# Patient Record
Sex: FEMALE | Race: WHITE
[De-identification: names, ages, dates, MRNs, and addresses within clinical notes are randomized per-mention and may not be internally consistent; named-entity substitution may affect disease eponyms.]

---

## 2019-06-12 ENCOUNTER — HOSPITAL ENCOUNTER (INPATIENT)
Dept: HOSPITAL 96 - M.ERS | Age: 49
LOS: 2 days | Discharge: HOME | DRG: 176 | End: 2019-06-14
Attending: INTERNAL MEDICINE | Admitting: INTERNAL MEDICINE
Payer: COMMERCIAL

## 2019-06-12 VITALS — SYSTOLIC BLOOD PRESSURE: 140 MMHG | DIASTOLIC BLOOD PRESSURE: 103 MMHG

## 2019-06-12 VITALS — WEIGHT: 214 LBS | HEIGHT: 62.99 IN | BODY MASS INDEX: 37.92 KG/M2

## 2019-06-12 DIAGNOSIS — Z82.49: ICD-10-CM

## 2019-06-12 DIAGNOSIS — J45.909: ICD-10-CM

## 2019-06-12 DIAGNOSIS — I26.99: Primary | ICD-10-CM

## 2019-06-12 DIAGNOSIS — F17.210: ICD-10-CM

## 2019-06-12 DIAGNOSIS — F12.90: ICD-10-CM

## 2019-06-12 DIAGNOSIS — K29.70: ICD-10-CM

## 2019-06-12 DIAGNOSIS — Z79.899: ICD-10-CM

## 2019-06-12 LAB
ABSOLUTE BASOPHILS: 0 THOU/UL (ref 0–0.2)
ABSOLUTE EOSINOPHILS: 0.1 THOU/UL (ref 0–0.7)
ABSOLUTE MONOCYTES: 0.8 THOU/UL (ref 0–1.2)
ALBUMIN SERPL-MCNC: 2.9 G/DL (ref 3.4–5)
ALP SERPL-CCNC: 90 U/L (ref 46–116)
ALT SERPL-CCNC: 28 U/L (ref 30–65)
ANION GAP SERPL CALC-SCNC: 5 MMOL/L (ref 7–16)
APTT BLD: 28.6 SECONDS (ref 25–31.3)
AST SERPL-CCNC: 23 U/L (ref 15–37)
BASOPHILS NFR BLD AUTO: 0.9 %
BILIRUB SERPL-MCNC: 0.4 MG/DL
BUN SERPL-MCNC: 12 MG/DL (ref 7–18)
CALCIUM SERPL-MCNC: 8.3 MG/DL (ref 8.5–10.1)
CHLORIDE SERPL-SCNC: 107 MMOL/L (ref 98–107)
CO2 SERPL-SCNC: 30 MMOL/L (ref 21–32)
CREAT SERPL-MCNC: 0.7 MG/DL (ref 0.6–1.3)
EOSINOPHIL NFR BLD: 1.8 %
GLUCOSE SERPL-MCNC: 98 MG/DL (ref 70–99)
GRANULOCYTES NFR BLD MANUAL: 57.4 %
HCT VFR BLD CALC: 38.2 % (ref 37–47)
HGB BLD-MCNC: 12.5 GM/DL (ref 12–15)
INR PPP: 1
LIPASE: 207 U/L (ref 73–393)
LYMPHOCYTES # BLD: 1.2 THOU/UL (ref 0.8–5.3)
LYMPHOCYTES NFR BLD AUTO: 24.4 %
MCH RBC QN AUTO: 25.9 PG (ref 26–34)
MCHC RBC AUTO-ENTMCNC: 32.8 G/DL (ref 28–37)
MCV RBC: 79.1 FL (ref 80–100)
MONOCYTES NFR BLD: 15.5 %
MPV: 8 FL. (ref 7.2–11.1)
NEUTROPHILS # BLD: 2.9 THOU/UL (ref 1.6–8.1)
NUCLEATED RBCS: 0 /100WBC
PLATELET COUNT*: 250 THOU/UL (ref 150–400)
POTASSIUM SERPL-SCNC: 4.4 MMOL/L (ref 3.5–5.1)
PROT SERPL-MCNC: 7 G/DL (ref 6.4–8.2)
PROTHROMBIN TIME: 9.9 SECONDS (ref 9.2–11.5)
RBC # BLD AUTO: 4.82 MIL/UL (ref 4.2–5)
RDW-CV: 17.7 % (ref 10.5–14.5)
SODIUM SERPL-SCNC: 142 MMOL/L (ref 136–145)
TROPONIN-I LEVEL: <0.06 NG/ML (ref ?–0.06)
WBC # BLD AUTO: 5 THOU/UL (ref 4–11)

## 2019-06-13 VITALS — SYSTOLIC BLOOD PRESSURE: 140 MMHG | DIASTOLIC BLOOD PRESSURE: 65 MMHG

## 2019-06-13 VITALS — SYSTOLIC BLOOD PRESSURE: 129 MMHG | DIASTOLIC BLOOD PRESSURE: 78 MMHG

## 2019-06-13 VITALS — SYSTOLIC BLOOD PRESSURE: 100 MMHG | DIASTOLIC BLOOD PRESSURE: 57 MMHG

## 2019-06-13 VITALS — DIASTOLIC BLOOD PRESSURE: 58 MMHG | SYSTOLIC BLOOD PRESSURE: 105 MMHG

## 2019-06-13 VITALS — SYSTOLIC BLOOD PRESSURE: 117 MMHG | DIASTOLIC BLOOD PRESSURE: 57 MMHG

## 2019-06-13 LAB — AMP/METHAMP: POSITIVE

## 2019-06-13 NOTE — NUR
RECEIEVED REPORT FROM [facility] IN ER OF EXPECTED ADMISSION AT 1542- DX:
CHEST PAIN WITH NEW ONSET OF PE- PT ARRIVED TO ROOM 221 VIA CART, SBA TO BED-
CARDIAC MONITOR PLACED AS ORDERED, TRACING SR- PT A&O X 4- CONTINENT OF BOWEL
AND BLADDER- UP AD-CLARISA IN ROOM, STEADY GAIT NOTED-PT NOTED TO BE FIGGITY AT
TIMES-TEARFUL- DIMINISHED LUNG SOUNDS NOTED- VS 97.4 18 129/78 81 97% ON RA-
ABD SOFT/ROUND/OBESE, BS X4 QUADS- LAST BM REPORTED 6/12/19- IV NOTED TO RIGHT
WRIST INTACT AND SL- SKIN C/D/I, TATOOS NOTED-DENIES ANY C/O PAIN AT TIME OF
ADMISSION- CALL LIGHT AND PERSONAL BELONGINGS WITH IN REACH- HOURLY ROUNDS IN
PLACE R/T SAFETY/NEEDS- ALL NEEDS MET AT THIS TIME-WCTM

## 2019-06-13 NOTE — EKG
[place] [address]
Phone:  [phone]                     ELECTROCARDIOGRAM REPORT      
_______________________________________________________________________________
 
Name:       TORREY DINEROELLE LAILA                Room:           [address]    ADM IN  
M.R.#:  [id number]      Account #:      [id number]  
Admission:  19     Attend Phys:    SNEHAL Denny
Discharge:               Date of Birth:  [birth year]  
         Report #: [id number]
    [id number]
_______________________________________________________________________________
THIS REPORT FOR:  //name//                      
 
                         [facility] ED
                                       
Test Date:    2019               Test Time:    13:33:34
Pat Name:     RHONDA DINERO            Department:   
Patient ID:   [id number]            Room:         [facility]
Gender:       F                        Technician:   MS
:          1970               Requested By: Quoc Dos Santos
Order Number: [id number]    Star MD:   Bandar Gurrola
                                 Measurements
Intervals                              Axis          
Rate:         65                       P:            53
MI:           169                      QRS:          79
QRSD:         88                       T:            74
QT:           402                                    
QTc:          418                                    
                           Interpretive Statements
Sinus rhythm
Baseline wander in lead(s) I,II,aVR,aVL,V1,V2,V3,V4,V5,V6
Compared to ECG 2016 23:27:29
No significant changes noted
Electronically Signed On 2019 17:22:30 CDT by Bandar Gurrola
[email or web address]
 
 
 
 
 
 
 
 
 
 
 
 
 
 
 
 
 
 
 
  <ELECTRONICALLY SIGNED>
                                           By: Bandar Gurrola MD, FACC   
  19     1722
D: 19 1333   _____________________________________
T: 19 1333   Bandar Gurrola MD, [facility]     /EPI

## 2019-06-13 NOTE — EKG
[place] [address]
Phone:  [phone]                     ELECTROCARDIOGRAM REPORT      
_______________________________________________________________________________
 
Name:       RHONDA DINERO                Room:           [address]    ADM IN  
M.R.#:  [id number]      Account #:      [id number]  
Admission:  19     Attend Phys:    SNEHAL Denny
Discharge:               Date of Birth:  [birth year]  
         Report #: [id number]
    [id number]
_______________________________________________________________________________
THIS REPORT FOR:  //name//                      
 
                         [facility] ED
                                       
Test Date:    2019               Test Time:    22:56:38
Pat Name:     RHONDA DINERO            Department:   
Patient ID:   [id number]            Room:         [facility]
Gender:       F                        Technician:   L[place]
:          1970               Requested By: Dolores Echevarria
Order Number: [id number] MD:   Bandar Gurrola
                                 Measurements
Intervals                              Axis          
Rate:         85                       P:            56
OR:           150                      QRS:          95
QRSD:         115                      T:            82
QT:           377                                    
QTc:          449                                    
                           Interpretive Statements
Sinus rhythm
Low voltage, extremity and precordial leads
Baseline wander in lead(s) I,III,aVR,aVL,V1,V2,V6
Compared to ECG 2016 23:27:29
Intraventricular conduction delay now present
Low QRS voltage now present
 
Electronically Signed On 2019 17:13:44 CDT by Bandar Gurrola
[email or web address]
 
 
 
 
 
 
 
 
 
 
 
 
 
 
 
 
  <ELECTRONICALLY SIGNED>
                                           By: Bandar Gurrola MD, FACC   
  19     1713
D: 19 2256   _____________________________________
T: 19 2256   Bandar Gurrola MD, FACC     /EPI

## 2019-06-14 VITALS — DIASTOLIC BLOOD PRESSURE: 66 MMHG | SYSTOLIC BLOOD PRESSURE: 102 MMHG

## 2019-06-14 VITALS — SYSTOLIC BLOOD PRESSURE: 114 MMHG | DIASTOLIC BLOOD PRESSURE: 84 MMHG

## 2019-06-14 VITALS — DIASTOLIC BLOOD PRESSURE: 69 MMHG | SYSTOLIC BLOOD PRESSURE: 112 MMHG

## 2019-06-14 VITALS — DIASTOLIC BLOOD PRESSURE: 68 MMHG | SYSTOLIC BLOOD PRESSURE: 114 MMHG

## 2019-06-14 LAB
INR PPP: 1
PROTHROMBIN TIME: 9.8 SECONDS (ref 9.2–11.5)

## 2019-06-14 NOTE — NUR
cm completed initial assessment to discuss d/c planning. pt is a&ox4. active
and independent. lives at home w/her fahter. has adult children that provides
support. pt denies hx w/hh or snf. 0 dme. [facility] will assist w/medication, as pt
has no insurance and is medicaid pending. pt goal is to d/c to home. cm to
remain avialable to assist as needed.

## 2019-06-14 NOTE — 2DMMODE
[place] [address]
Phone:  [phone] 2 D/M-MODE ECHOCARDIOGRAM     
_______________________________________________________________________________
 
Name:         RHONDA DINERO LAILA               Room:          [address]    ADM IN 
[facility]#:    [id number]     Account #:     [id number]  
Admission:    19    Attend Phys:   Quoc Dos Santos
Discharge:                Date of Birth: [birth year]  
Date of Service: 19 1439  Report #:      [id number]
        [id number]
_______________________________________________________________________________
THIS REPORT FOR:  //name//                      
 
 
--------------- APPROVED REPORT --------------
 
 
Study performed:  2019 10:27:04
 
EXAM: Comprehensive 2D, Doppler, and color-flow 
Echocardiogram 
Patient Location: In-Patient   
Room #:  221     Status:  routine
 
      BSA:         1.99
HR: 74 bpm BP:          112/69 mmHg 
Rhythm: NSR     
 
Other Information 
Study Quality: Good
 
Indications
Dyspnea 
 
2D Dimensions
IVSd:  10.16 (7-11mm) LVOT Diam:  21.31 (18-24mm) 
LVDd:  42.94 mm  
PWd:  9.13 (7-11mm) Ascending Ao:  31.84 (22-36mm)
LVDs:  28.54 (25-40mm) 
Aortic Root:  28.21 mm 
 
Volumes
Left Atrial Volume (Systole) 
    LA ESV Index:  16.60 mL/m2
 
Aortic Valve
AoV Peak Rafal.:  1.17 m/s 
AO Peak Gr.:  5.45 mmHg  LVOT Max P.51 mmHg
AO Mean Gr.:  3.03 mmHg  LVOT Mean P.65 mmHg
    LVOT Max V:  1.17 m/s
AO V2 VTI:  24.12 cm  LVOT Mean V:  0.75 m/s
JAE (VTI):  3.48 cm2  LVOT V1 VTI:  23.52 cm
 
Mitral Valve
    E/A Ratio:  1.55
    MV Decel. Time:  211.34 ms
MV E Max Rafal.:  0.79 m/s 
 
 
[place] [address]
Phone:  [phone]                     2 D/M-MODE ECHOCARDIOGRAM     
_______________________________________________________________________________
 
Name:         RHONDA DINERO               Room:          [address] [place] 
..#:    [id number]     Account #:     [id number]  
Admission:    19    Attend Phys:   Quoc Dos Santos
Discharge:                Date of Birth: [birth year]  
Date of Service: 19 1439  Report #:      [id number]
        [id number]
_______________________________________________________________________________
MV PHT:  61.29 ms  
MVA (PHT):  3.59 cm2  
 
TDI
E/Lateral E':  4.65 E/Medial E':  6.58
   Medial E' Rafal.:  0.12 m/s
   Lateral E' Rafal.:  0.17 m/s
 
Pulmonary Valve
PV Peak Rafal.:  0.89 m/s PV Peak Gr.:  3.14 mmHg
 
Tricuspid Valve
    RAP Estimate:  5.00 mmHg
TR Peak Gr.:  23.43 mmHg RVSP:  28.00 mmHg
    PA Pressure:  28.00 mmHg
 
Left Ventricle
The left ventricle is normal size. There is normal LV segmental wall 
motion. There is normal left ventricular wall thickness. Left 
ventricular systolic function is normal. LVEF is 55-60%. The left 
ventricular diastolic function is normal.
 
Right Ventricle
The right ventricle is normal size. The right ventricular systolic 
function is normal.
 
Atria
The left atrium size is normal. The right atrium size is 
normal.
 
Aortic Valve
The aortic valve is normal in structure. No aortic regurgitation is 
present. There is no aortic valvular stenosis.
 
Mitral Valve
The mitral valve is normal in structure. Trace mitral regurgitation. 
No evidence of mitral valve stenosis.
 
Tricuspid Valve
The tricuspid valve is normal in structure. Trace tricuspid 
regurgitation. No pulmonary hypertension.
 
Pulmonic Valve
The pulmonary valve is normal in structure. There is no pulmonic 
valvular regurgitation.
 
 
 
[place] [address]
Phone:  [phone] 2 D/M-MODE ECHOCARDIOGRAM     
_______________________________________________________________________________
 
Name:         RHONDA DINERO               Room:          [address]#:    [id number]     Account #:     [id number]  
Admission:    19    Attend Phys:   Quoc Dos Santos
Discharge:                Date of Birth: [birth year]  
Date of Service: 19 1439  Report #:      [id number]
        [id number]
_______________________________________________________________________________
Great Vessels
The aortic root is normal in size. IVC is normal in size and 
collapses >50% with inspiration.
 
Pericardium
There is no pericardial effusion.
 
<Conclusion>
The left ventricle is normal size.
There is normal left ventricular wall thickness.
Left ventricular systolic function is normal.
LVEF is 55-60%.
The left ventricular diastolic function is normal.
Trace mitral regurgitation.
Trace tricuspid regurgitation.
No pulmonary hypertension.
IVC is normal in size and collapses >50% with inspiration.
 
 
 
 
 
 
 
 
 
 
 
 
 
 
 
 
 
 
 
 
 
 
 
 
 
 
 
  <ELECTRONICALLY SIGNED>
                                           By: Bandar Gurrola MD, FACC   
  19     1439
D: 19 1439   _____________________________________
T: 19 1439   Bandar Gurrola MD, FACC     /INF

## 2019-06-14 NOTE — NUR
ASSUMED PATIENT CARE AT 1900.  PATIENT ALERT AND ORIENTED TIMES FOUR.  MINOR
COMPLAINTS OF A HEADACHE.  IV MEDS GIVEN AS PATIENT DOES NOT HAVE ANY ORAL
MEDICATION.  VERBALIZES UNDERSTANDING OF CARE PLAN AND WHAT TO EXPECT WITH
TREATMENT.  DENIED THAT HEADACHE WAS WITHDRAWL RELATED.  RN ASSESSMENT AND
HOURLY ROUNDING COMPLETED AS DOCUMENTED.

## 2019-06-14 NOTE — NUR
VSS, ASSUMED CARE IN THE AM, ASSESSMENT PERFORMED AND CHARTED FALL PRECAUTIONS
IN PLACE AND CALL LIGHT IN REACH, PT DENIES ANY COMPLANTS OF PAIN AND IS
TRACING SR ON THE MONITOR, HER GOAL IS TO DISCHARGE TO HOME, AT THIS TIME SHE
HAS BEEN BEEN GIVEN DISCHARGE ORDERS, AND HER IV AND TELE MONITOR TAKEN OFF,
SHE HAD SCRIPTS CALLED IN AND DISCHARGE PAPERS PROVITED, PT DENIES ANY
QUESTIONS AT TIME OF D/C SHE HAD GATHERED ALL BELONGINGS AND PLACED WITH AT
TIME OF D/C HOURLY ROUNDS COMPLETED.

## 2019-06-19 ENCOUNTER — HOSPITAL ENCOUNTER (EMERGENCY)
Dept: HOSPITAL 96 - M.ERS | Age: 49
Discharge: HOME | End: 2019-06-19
Payer: COMMERCIAL

## 2019-06-19 VITALS — SYSTOLIC BLOOD PRESSURE: 91 MMHG | DIASTOLIC BLOOD PRESSURE: 56 MMHG

## 2019-06-19 VITALS — BODY MASS INDEX: 33.13 KG/M2 | WEIGHT: 180 LBS | HEIGHT: 62 IN

## 2019-06-19 DIAGNOSIS — J45.909: ICD-10-CM

## 2019-06-19 DIAGNOSIS — R07.89: Primary | ICD-10-CM

## 2019-06-19 DIAGNOSIS — F17.210: ICD-10-CM

## 2019-06-19 LAB
ABSOLUTE BASOPHILS: 0.1 THOU/UL (ref 0–0.2)
ABSOLUTE EOSINOPHILS: 0.5 THOU/UL (ref 0–0.7)
ABSOLUTE MONOCYTES: 0.8 THOU/UL (ref 0–1.2)
ALBUMIN SERPL-MCNC: 2.9 G/DL (ref 3.4–5)
ALP SERPL-CCNC: 84 U/L (ref 46–116)
ALT SERPL-CCNC: 65 U/L (ref 30–65)
ANION GAP SERPL CALC-SCNC: 9 MMOL/L (ref 7–16)
AST SERPL-CCNC: 33 U/L (ref 15–37)
BASOPHILS NFR BLD AUTO: 0.8 %
BILIRUB SERPL-MCNC: 0.3 MG/DL
BILIRUB UR-MCNC: NEGATIVE MG/DL
BUN SERPL-MCNC: 23 MG/DL (ref 7–18)
CALCIUM SERPL-MCNC: 8.8 MG/DL (ref 8.5–10.1)
CHLORIDE SERPL-SCNC: 104 MMOL/L (ref 98–107)
CO2 SERPL-SCNC: 27 MMOL/L (ref 21–32)
COLOR UR: YELLOW
CREAT SERPL-MCNC: 0.5 MG/DL (ref 0.6–1.3)
EOSINOPHIL NFR BLD: 4.5 %
GLUCOSE SERPL-MCNC: 105 MG/DL (ref 70–99)
GRANULOCYTES NFR BLD MANUAL: 57.9 %
HCT VFR BLD CALC: 38.2 % (ref 37–47)
HGB BLD-MCNC: 12.3 GM/DL (ref 12–15)
INR PPP: 1.2
KETONES UR STRIP-MCNC: NEGATIVE MG/DL
LYMPHOCYTES # BLD: 3.3 THOU/UL (ref 0.8–5.3)
LYMPHOCYTES NFR BLD AUTO: 29.9 %
MCH RBC QN AUTO: 25.2 PG (ref 26–34)
MCHC RBC AUTO-ENTMCNC: 32.1 G/DL (ref 28–37)
MCV RBC: 78.6 FL (ref 80–100)
MONOCYTES NFR BLD: 6.9 %
MPV: 7.2 FL. (ref 7.2–11.1)
NEUTROPHILS # BLD: 6.4 THOU/UL (ref 1.6–8.1)
NUCLEATED RBCS: 0 /100WBC
PLATELET COUNT*: 415 THOU/UL (ref 150–400)
POTASSIUM SERPL-SCNC: 4 MMOL/L (ref 3.5–5.1)
PROT SERPL-MCNC: 6.8 G/DL (ref 6.4–8.2)
PROT UR QL STRIP: NEGATIVE
PROTHROMBIN TIME: 11.9 SECONDS (ref 9.2–11.5)
RBC # BLD AUTO: 4.86 MIL/UL (ref 4.2–5)
RBC # UR STRIP: NEGATIVE /UL
RDW-CV: 17.5 % (ref 10.5–14.5)
SODIUM SERPL-SCNC: 140 MMOL/L (ref 136–145)
SP GR UR STRIP: 1.02 (ref 1–1.03)
URINE CLARITY: CLEAR
URINE GLUCOSE-RANDOM: NEGATIVE
URINE LEUKOCYTES-REFLEX: NEGATIVE
URINE NITRITE-REFLEX: NEGATIVE
UROBILINOGEN UR STRIP-ACNC: 0.2 E.U./DL (ref 0.2–1)
WBC # BLD AUTO: 11.1 THOU/UL (ref 4–11)

## 2019-06-19 NOTE — EKG
[place] [address]
Phone:  [phone]                     ELECTROCARDIOGRAM REPORT      
_______________________________________________________________________________
 
Name:       RHONDA DINERO                Room:                      [facility]  
HENRRY#:  [id number]      Account #:      [id number]  
Admission:  19     Attend Phys:                         
Discharge:  19     Date of Birth:  [birth year]  
         Report #: [id number]
    [id number]
_______________________________________________________________________________
THIS REPORT FOR:  //name//                      
 
                         [facility] ED
                                       
Test Date:    2019               Test Time:    04:02:42
Pat Name:     RHONDA DINERO            Department:   
Patient ID:   [id number]            Room:          
Gender:       F                        Technician:   ALVERTO
:          1970               Requested By: Karin Chu
Order Number: [id number]    Reading MD:   Mikel Nelson
                                 Measurements
Intervals                              Axis          
Rate:         92                       P:            71
DE:           167                      QRS:          87
QRSD:         78                       T:            68
QT:           342                                    
QTc:          424                                    
                           Interpretive Statements
Sinus rhythm
Compared to ECG 2019 13:33:34
No significant changes
 
Electronically Signed On 2019 11:04:40 CDT by Mikel Nelson
[email or web address]
 
 
 
 
 
 
 
 
 
 
 
 
 
 
 
 
 
 
 
  <ELECTRONICALLY SIGNED>
                                           By: Mikel Nelson MD, [facility]      
  19     1104
D: 19 040   _____________________________________
T: 19 0402   Mikel Nelson MD, FACC        /EPI

## 2019-06-27 ENCOUNTER — HOSPITAL ENCOUNTER (EMERGENCY)
Dept: HOSPITAL 96 - M.ERS | Age: 49
Discharge: HOME | End: 2019-06-27
Payer: COMMERCIAL

## 2019-06-27 VITALS — HEIGHT: 63 IN | BODY MASS INDEX: 35.44 KG/M2 | WEIGHT: 200 LBS

## 2019-06-27 VITALS — SYSTOLIC BLOOD PRESSURE: 148 MMHG | DIASTOLIC BLOOD PRESSURE: 76 MMHG

## 2019-06-27 DIAGNOSIS — R22.0: Primary | ICD-10-CM

## 2019-06-27 DIAGNOSIS — T45.515A: ICD-10-CM

## 2019-06-27 DIAGNOSIS — F17.210: ICD-10-CM

## 2019-08-10 ENCOUNTER — HOSPITAL ENCOUNTER (EMERGENCY)
Dept: HOSPITAL 96 - M.ERS | Age: 49
LOS: 1 days | Discharge: HOME | End: 2019-08-11
Payer: COMMERCIAL

## 2019-08-10 VITALS — BODY MASS INDEX: 35.44 KG/M2 | WEIGHT: 200 LBS | HEIGHT: 63 IN

## 2019-08-10 DIAGNOSIS — R07.89: Primary | ICD-10-CM

## 2019-08-10 DIAGNOSIS — F17.210: ICD-10-CM

## 2019-08-10 DIAGNOSIS — Z87.442: ICD-10-CM

## 2019-08-10 DIAGNOSIS — Z98.51: ICD-10-CM

## 2019-08-11 VITALS — DIASTOLIC BLOOD PRESSURE: 59 MMHG | SYSTOLIC BLOOD PRESSURE: 103 MMHG

## 2019-08-11 LAB
ABSOLUTE BASOPHILS: 0.1 THOU/UL (ref 0–0.2)
ABSOLUTE EOSINOPHILS: 0.7 THOU/UL (ref 0–0.7)
ABSOLUTE MONOCYTES: 0.7 THOU/UL (ref 0–1.2)
ALBUMIN SERPL-MCNC: 3 G/DL (ref 3.4–5)
ALP SERPL-CCNC: 82 U/L (ref 46–116)
ALT SERPL-CCNC: 16 U/L (ref 30–65)
ANION GAP SERPL CALC-SCNC: 5 MMOL/L (ref 7–16)
AST SERPL-CCNC: 12 U/L (ref 15–37)
BASOPHILS NFR BLD AUTO: 1.2 %
BILIRUB SERPL-MCNC: 0.3 MG/DL
BUN SERPL-MCNC: 21 MG/DL (ref 7–18)
CALCIUM SERPL-MCNC: 8.8 MG/DL (ref 8.5–10.1)
CHLORIDE SERPL-SCNC: 106 MMOL/L (ref 98–107)
CO2 SERPL-SCNC: 28 MMOL/L (ref 21–32)
CREAT SERPL-MCNC: 0.6 MG/DL (ref 0.6–1.3)
EOSINOPHIL NFR BLD: 7.5 %
GLUCOSE SERPL-MCNC: 100 MG/DL (ref 70–99)
GRANULOCYTES NFR BLD MANUAL: 53.5 %
HCT VFR BLD CALC: 34 % (ref 37–47)
HGB BLD-MCNC: 11.2 GM/DL (ref 12–15)
LIPASE: 267 U/L (ref 73–393)
LYMPHOCYTES # BLD: 2.7 THOU/UL (ref 0.8–5.3)
LYMPHOCYTES NFR BLD AUTO: 29.9 %
MAGNESIUM SERPL-MCNC: 1.9 MG/DL (ref 1.8–2.4)
MCH RBC QN AUTO: 26 PG (ref 26–34)
MCHC RBC AUTO-ENTMCNC: 32.8 G/DL (ref 28–37)
MCV RBC: 79.2 FL (ref 80–100)
MONOCYTES NFR BLD: 7.9 %
MPV: 7.4 FL. (ref 7.2–11.1)
NEUTROPHILS # BLD: 4.9 THOU/UL (ref 1.6–8.1)
NT-PRO BRAIN NAT PEPTIDE: 90 PG/ML (ref ?–300)
NUCLEATED RBCS: 0 /100WBC
PLATELET COUNT*: 296 THOU/UL (ref 150–400)
POTASSIUM SERPL-SCNC: 3.7 MMOL/L (ref 3.5–5.1)
PROT SERPL-MCNC: 6.7 G/DL (ref 6.4–8.2)
RBC # BLD AUTO: 4.3 MIL/UL (ref 4.2–5)
RDW-CV: 16.7 % (ref 10.5–14.5)
SODIUM SERPL-SCNC: 139 MMOL/L (ref 136–145)
TROPONIN-I LEVEL: <0.06 NG/ML (ref ?–0.06)
WBC # BLD AUTO: 9.2 THOU/UL (ref 4–11)

## 2019-08-13 NOTE — EKG
[place] [address]
Phone:  [phone]                     ELECTROCARDIOGRAM REPORT      
_______________________________________________________________________________
 
Name:       ELAINE SERNA                [id number]      Account #:      [id number]  
Admission:  08/10/19     Attend Phys:                         
Discharge:  19     Date of Birth:  [birth year]  
         Report #: [id number]
    [id number]
_______________________________________________________________________________
THIS REPORT FOR:  //name//                      
 
                         [facility] ED
                                       
Test Date:    2019               Test Time:    00:00:18
Pat Name:     ELAINE SERNA            Department:   
Patient ID:   [id number]            Room:          
Gender:       F                        Technician:   MORGAN
:          1970               Requested By: John Oliver
Order Number: [id number] MD:   Dre Dsouza
                                 Measurements
Intervals                              Axis          
Rate:         80                       P:            13
OK:           167                      QRS:          77
QRSD:         97                       T:            70
QT:           370                                    
QTc:          427                                    
                           Interpretive Statements
Sinus rhythm
Low voltage, extremity and precordial leads
Abnormal R-wave progression, early transition
Baseline wander 
No previous ECG available for comparison
 
Electronically Signed On 2019 9:27:21 CDT by Dre Dsouza
[email or web address]
 
 
 
 
 
 
 
 
 
 
 
 
 
 
 
 
 
  <ELECTRONICALLY SIGNED>
                                           By: Dre Dsouza MD, [facility]   
  19     0927
D: 19 0000   _____________________________________
T: 19 0000   Dre Dsouza MD, FAC     /EPI

## 2019-08-28 ENCOUNTER — HOSPITAL ENCOUNTER (EMERGENCY)
Dept: HOSPITAL 96 - M.ERS | Age: 49
Discharge: HOME | End: 2019-08-28
Payer: COMMERCIAL

## 2019-08-28 VITALS — BODY MASS INDEX: 38.99 KG/M2 | HEIGHT: 65 IN | WEIGHT: 234 LBS

## 2019-08-28 VITALS — DIASTOLIC BLOOD PRESSURE: 74 MMHG | SYSTOLIC BLOOD PRESSURE: 126 MMHG

## 2019-08-28 DIAGNOSIS — F17.210: ICD-10-CM

## 2019-08-28 DIAGNOSIS — R07.89: Primary | ICD-10-CM

## 2019-08-28 DIAGNOSIS — Z98.51: ICD-10-CM

## 2019-08-28 LAB
ABSOLUTE BASOPHILS: 0.1 THOU/UL (ref 0–0.2)
ABSOLUTE EOSINOPHILS: 0.5 THOU/UL (ref 0–0.7)
ABSOLUTE MONOCYTES: 0.9 THOU/UL (ref 0–1.2)
ALBUMIN SERPL-MCNC: 3.1 G/DL (ref 3.4–5)
ALP SERPL-CCNC: 76 U/L (ref 46–116)
ALT SERPL-CCNC: 17 U/L (ref 30–65)
ANION GAP SERPL CALC-SCNC: 6 MMOL/L (ref 7–16)
AST SERPL-CCNC: 11 U/L (ref 15–37)
BASOPHILS NFR BLD AUTO: 0.8 %
BILIRUB SERPL-MCNC: 0.3 MG/DL
BUN SERPL-MCNC: 15 MG/DL (ref 7–18)
CALCIUM SERPL-MCNC: 8.1 MG/DL (ref 8.5–10.1)
CHLORIDE SERPL-SCNC: 106 MMOL/L (ref 98–107)
CO2 SERPL-SCNC: 27 MMOL/L (ref 21–32)
CREAT SERPL-MCNC: 0.6 MG/DL (ref 0.6–1.3)
EOSINOPHIL NFR BLD: 5 %
GLUCOSE SERPL-MCNC: 96 MG/DL (ref 70–99)
GRANULOCYTES NFR BLD MANUAL: 58.6 %
HCT VFR BLD CALC: 33.7 % (ref 37–47)
HGB BLD-MCNC: 11.1 GM/DL (ref 12–15)
LIPASE: 201 U/L (ref 73–393)
LYMPHOCYTES # BLD: 2.4 THOU/UL (ref 0.8–5.3)
LYMPHOCYTES NFR BLD AUTO: 25.8 %
MAGNESIUM SERPL-MCNC: 1.6 MG/DL (ref 1.8–2.4)
MCH RBC QN AUTO: 26.6 PG (ref 26–34)
MCHC RBC AUTO-ENTMCNC: 33 G/DL (ref 28–37)
MCV RBC: 80.8 FL (ref 80–100)
MONOCYTES NFR BLD: 9.8 %
MPV: 7 FL. (ref 7.2–11.1)
NEUTROPHILS # BLD: 5.5 THOU/UL (ref 1.6–8.1)
NT-PRO BRAIN NAT PEPTIDE: 87 PG/ML (ref ?–300)
NUCLEATED RBCS: 0 /100WBC
PLATELET COUNT*: 405 THOU/UL (ref 150–400)
POTASSIUM SERPL-SCNC: 4.2 MMOL/L (ref 3.5–5.1)
PROT SERPL-MCNC: 6.7 G/DL (ref 6.4–8.2)
RBC # BLD AUTO: 4.17 MIL/UL (ref 4.2–5)
RDW-CV: 17.3 % (ref 10.5–14.5)
SODIUM SERPL-SCNC: 139 MMOL/L (ref 136–145)
TROPONIN-I LEVEL: <0.06 NG/ML (ref ?–0.06)
WBC # BLD AUTO: 9.4 THOU/UL (ref 4–11)

## 2019-08-28 NOTE — EKG
[place] [address]
Phone:  [phone]                     ELECTROCARDIOGRAM REPORT      
_______________________________________________________________________________
 
Name:       ELAINE SERNA                [id number]      Account #:      [id number]  
Admission:  19     Attend Phys:                         
Discharge:  19     Date of Birth:  [birth year]  
         Report #: [id number]
    [id number]
_______________________________________________________________________________
THIS REPORT FOR:  //name//                      
 
                         [facility] ED
                                       
Test Date:    2019               Test Time:    09:55:16
Pat Name:     ELAINE SERNA            Department:   
Patient ID:   [id number]            Room:          
Gender:       F                        Technician:   HARPAL
:          1970               Requested By: John Oliver
Order Number: [id number] MD:   Maicol Heredia
                                 Measurements
Intervals                              Axis          
Rate:         62                       P:            -34
[place]:           228                      QRS:          64
QRSD:         103                      T:            59
QT:           405                                    
QTc:          412                                    
                           Interpretive Statements
Sinus rhythm
Prolonged [place] interval
Borderline low voltage, extremity leads
Compared to ECG 2019 07:49:56
First degree AV block now present
 
Electronically Signed On 2019 16:57:04 CDT by Maicol Heredia
[email or web address]
 
 
 
 
 
 
 
 
 
 
 
 
 
 
 
 
 
  <ELECTRONICALLY SIGNED>
                                           By: Maicol Heredia MD, [facility]      
  19     1657
D: 08955   _____________________________________
T: 19   Maicol Heredia MD, FACC        /EPI

## 2019-08-28 NOTE — EKG
[place] [address]
Phone:  [phone]                     ELECTROCARDIOGRAM REPORT      
_______________________________________________________________________________
 
Name:       KAREEMELAINE                Room:                      [facility]  
M.R.#:  [id number]      Account #:      [id number]  
Admission:  19     Attend Phys:                         
Discharge:               Date of Birth:  [birth year]  
         Report #: [id number]
    [id number]
_______________________________________________________________________________
THIS REPORT FOR:  //name//                      
 
                          [facility]
                                       
Test Date:    2019               Test Time:    07:49:56
Pat Name:     ELAINE SERNA            Department:   
Patient ID:   [id number]            Room:          
Gender:       F                        Technician:   SHAY
:          1970               Requested By: John Oliver
Order Number: [id number] MD:   Maicol Heredia
                                 Measurements
Intervals                              Axis          
Rate:         77                       P:            24
[place]:           172                      QRS:          71
QRSD:         99                       T:            60
QT:           361                                    
QTc:          409                                    
                           Interpretive Statements
Sinus rhythm
Borderline low voltage, extremity leads
Abnormal R-wave progression, early transition
Compared to ECG 2019 00:00:18
No significant changes
 
Electronically Signed On 2019 10:29:58 CDT by Maicol Heredia
[email or web address]
 
 
 
 
 
 
 
 
 
 
 
 
 
 
 
 
 
  <ELECTRONICALLY SIGNED>
                                           By: Maicol Heredia MD, [facility]      
  19     1029
D: 19 0749   _____________________________________
T: 19 0749   Maicol Heredia MD, FACC        /EPI

## 2019-09-20 ENCOUNTER — HOSPITAL ENCOUNTER (EMERGENCY)
Dept: HOSPITAL 96 - M.ERS | Age: 49
Discharge: HOME | End: 2019-09-20
Payer: COMMERCIAL

## 2019-09-20 VITALS — WEIGHT: 220 LBS | BODY MASS INDEX: 38.98 KG/M2 | HEIGHT: 63 IN

## 2019-09-20 VITALS — SYSTOLIC BLOOD PRESSURE: 112 MMHG | DIASTOLIC BLOOD PRESSURE: 70 MMHG

## 2019-09-20 DIAGNOSIS — F17.210: ICD-10-CM

## 2019-09-20 DIAGNOSIS — Z98.51: ICD-10-CM

## 2019-09-20 DIAGNOSIS — Z87.442: ICD-10-CM

## 2019-09-20 DIAGNOSIS — Z88.8: ICD-10-CM

## 2019-09-20 DIAGNOSIS — R07.89: Primary | ICD-10-CM

## 2019-09-20 LAB
ABSOLUTE BASOPHILS: 0.1 THOU/UL (ref 0–0.2)
ABSOLUTE EOSINOPHILS: 0.6 THOU/UL (ref 0–0.7)
ABSOLUTE MONOCYTES: 0.5 THOU/UL (ref 0–1.2)
ALBUMIN SERPL-MCNC: 2.9 G/DL (ref 3.4–5)
ALP SERPL-CCNC: 73 U/L (ref 46–116)
ALT SERPL-CCNC: 19 U/L (ref 30–65)
ANION GAP SERPL CALC-SCNC: 5 MMOL/L (ref 7–16)
APTT BLD: 30.6 SECONDS (ref 25–31.3)
AST SERPL-CCNC: 13 U/L (ref 15–37)
BASOPHILS NFR BLD AUTO: 0.7 %
BILIRUB SERPL-MCNC: 0.2 MG/DL
BUN SERPL-MCNC: 13 MG/DL (ref 7–18)
CALCIUM SERPL-MCNC: 7.9 MG/DL (ref 8.5–10.1)
CHLORIDE SERPL-SCNC: 106 MMOL/L (ref 98–107)
CK-MB MASS: 1 NG/ML
CO2 SERPL-SCNC: 28 MMOL/L (ref 21–32)
CREAT SERPL-MCNC: 0.7 MG/DL (ref 0.6–1.3)
EOSINOPHIL NFR BLD: 7.3 %
GLUCOSE SERPL-MCNC: 93 MG/DL (ref 70–99)
GRANULOCYTES NFR BLD MANUAL: 63 %
HCT VFR BLD CALC: 38.5 % (ref 37–47)
HGB BLD-MCNC: 12.6 GM/DL (ref 12–15)
INR PPP: 1.1
LIPASE: 128 U/L (ref 73–393)
LYMPHOCYTES # BLD: 1.8 THOU/UL (ref 0.8–5.3)
LYMPHOCYTES NFR BLD AUTO: 22.2 %
MAGNESIUM SERPL-MCNC: 1.8 MG/DL (ref 1.8–2.4)
MCH RBC QN AUTO: 26.1 PG (ref 26–34)
MCHC RBC AUTO-ENTMCNC: 32.8 G/DL (ref 28–37)
MCV RBC: 79.6 FL (ref 80–100)
MONOCYTES NFR BLD: 6.8 %
MPV: 7.5 FL. (ref 7.2–11.1)
NEUTROPHILS # BLD: 5 THOU/UL (ref 1.6–8.1)
NT-PRO BRAIN NAT PEPTIDE: 52 PG/ML (ref ?–300)
NUCLEATED RBCS: 0 /100WBC
PLATELET COUNT*: 446 THOU/UL (ref 150–400)
POTASSIUM SERPL-SCNC: 4.3 MMOL/L (ref 3.5–5.1)
PROT SERPL-MCNC: 6.4 G/DL (ref 6.4–8.2)
PROTHROMBIN TIME: 11.3 SECONDS (ref 9.2–11.5)
RBC # BLD AUTO: 4.84 MIL/UL (ref 4.2–5)
RDW-CV: 17.7 % (ref 10.5–14.5)
SODIUM SERPL-SCNC: 139 MMOL/L (ref 136–145)
TROPONIN-I LEVEL: <0.06 NG/ML (ref ?–0.06)
WBC # BLD AUTO: 7.9 THOU/UL (ref 4–11)

## 2019-09-20 NOTE — EKG
[place] [address]
Phone:  [phone]                     ELECTROCARDIOGRAM REPORT      
_______________________________________________________________________________
 
Name:       ELAINE SERNA                Room:                      [facility]  
M.RAlexandr#:  [id number]      Account #:      [id number]  
Admission:  19     Attend Phys:                         
Discharge:               Date of Birth:  [birth year]  
         Report #: [id number]
    [id number]
_______________________________________________________________________________
THIS REPORT FOR:  //name//                      
 
                         [facility] ED
                                       
Test Date:    2019               Test Time:    07:04:18
Pat Name:     ELAINE SERNA            Department:   
Patient ID:   [id number]            Room:          
Gender:       F                        Technician:   CD
:          1970               Requested By: Taz Angeles
Order Number: [id number] MD:   Maicol Heredia
                                 Measurements
Intervals                              Axis          
Rate:         78                       P:            61
[place]:           170                      QRS:          80
QRSD:         84                       T:            72
QT:           374                                    
QTc:          426                                    
                           Interpretive Statements
Sinus rhythm
Compared to ECG 2019 09:55:16
First degree AV block no longer present
 
Electronically Signed On 2019 12:47:10 CDT by Maicol Heredia
[email or web address]
 
 
 
 
 
 
 
 
 
 
 
 
 
 
 
 
 
 
 
  <ELECTRONICALLY SIGNED>
                                           By: Maicol Heredia MD, [facility]      
  19     1247
D: 19 0704   _____________________________________
T: 19 07   Maicol Heredia MD, FACC        /EPI

## 2019-10-25 ENCOUNTER — HOSPITAL ENCOUNTER (EMERGENCY)
Dept: HOSPITAL 96 - M.ERS | Age: 49
Discharge: HOME | End: 2019-10-25
Payer: COMMERCIAL

## 2019-10-25 VITALS — DIASTOLIC BLOOD PRESSURE: 50 MMHG | SYSTOLIC BLOOD PRESSURE: 100 MMHG

## 2019-10-25 VITALS — WEIGHT: 223 LBS | BODY MASS INDEX: 39.51 KG/M2 | HEIGHT: 63 IN

## 2019-10-25 DIAGNOSIS — F17.210: ICD-10-CM

## 2019-10-25 DIAGNOSIS — Z98.51: ICD-10-CM

## 2019-10-25 DIAGNOSIS — Z87.442: ICD-10-CM

## 2019-10-25 DIAGNOSIS — R11.2: ICD-10-CM

## 2019-10-25 DIAGNOSIS — K80.50: Primary | ICD-10-CM

## 2019-10-25 DIAGNOSIS — Z88.8: ICD-10-CM

## 2019-10-25 LAB
ABSOLUTE BASOPHILS: 0.1 THOU/UL (ref 0–0.2)
ABSOLUTE EOSINOPHILS: 0.6 THOU/UL (ref 0–0.7)
ABSOLUTE MONOCYTES: 0.6 THOU/UL (ref 0–1.2)
ALBUMIN SERPL-MCNC: 3.6 G/DL (ref 3.4–5)
ALP SERPL-CCNC: 67 U/L (ref 46–116)
ALT SERPL-CCNC: 21 U/L (ref 30–65)
ANION GAP SERPL CALC-SCNC: 7 MMOL/L (ref 7–16)
AST SERPL-CCNC: 12 U/L (ref 15–37)
BASOPHILS NFR BLD AUTO: 0.9 %
BILIRUB SERPL-MCNC: 0.3 MG/DL
BUN SERPL-MCNC: 19 MG/DL (ref 7–18)
CALCIUM SERPL-MCNC: 9.2 MG/DL (ref 8.5–10.1)
CHLORIDE SERPL-SCNC: 105 MMOL/L (ref 98–107)
CO2 SERPL-SCNC: 29 MMOL/L (ref 21–32)
CREAT SERPL-MCNC: 0.8 MG/DL (ref 0.6–1.3)
EOSINOPHIL NFR BLD: 7 %
GLUCOSE SERPL-MCNC: 96 MG/DL (ref 70–99)
GRANULOCYTES NFR BLD MANUAL: 65.6 %
HCT VFR BLD CALC: 39.8 % (ref 37–47)
HGB BLD-MCNC: 13.1 GM/DL (ref 12–15)
LIPASE: 134 U/L (ref 73–393)
LYMPHOCYTES # BLD: 1.7 THOU/UL (ref 0.8–5.3)
LYMPHOCYTES NFR BLD AUTO: 19.4 %
MCH RBC QN AUTO: 27.5 PG (ref 26–34)
MCHC RBC AUTO-ENTMCNC: 32.9 G/DL (ref 28–37)
MCV RBC: 83.5 FL (ref 80–100)
MONOCYTES NFR BLD: 7.1 %
MPV: 7.5 FL. (ref 7.2–11.1)
NEUTROPHILS # BLD: 5.8 THOU/UL (ref 1.6–8.1)
NUCLEATED RBCS: 0 /100WBC
PLATELET COUNT*: 308 THOU/UL (ref 150–400)
POTASSIUM SERPL-SCNC: 4 MMOL/L (ref 3.5–5.1)
PROT SERPL-MCNC: 7.4 G/DL (ref 6.4–8.2)
RBC # BLD AUTO: 4.76 MIL/UL (ref 4.2–5)
RDW-CV: 19.3 % (ref 10.5–14.5)
SODIUM SERPL-SCNC: 141 MMOL/L (ref 136–145)
WBC # BLD AUTO: 8.9 THOU/UL (ref 4–11)

## 2019-10-28 NOTE — EKG
[place] [address]
Phone:  [phone]                     ELECTROCARDIOGRAM REPORT      
_______________________________________________________________________________
 
Name:       RHONDA DINERON             Room:                      [facility]  
HENRRY#:  [id number]      Account #:      [id number]  
Admission:  10/25/19     Attend Phys:                         
Discharge:  10/25/19     Date of Birth:  [birth year]  
         Report #: [id number]
    [id number]
_______________________________________________________________________________
THIS REPORT FOR:  //name//                      
 
                         [facility] ED
                                       
Test Date:    2019-10-25               Test Time:    20:51:32
Pat Name:     RHONDA DINERO            Department:   
Patient ID:   [id number]            Room:          
Gender:       F                        Technician:   ARTIE
:          1970               Requested By: Dolores Echevarria
Order Number: [id number]    Reading MD:   Mikel Nelson
                                 Measurements
Intervals                              Axis          
Rate:         84                       P:            46
ND:           181                      QRS:          77
QRSD:         84                       T:            58
QT:           359                                    
QTc:          425                                    
                           Interpretive Statements
Sinus rhythm
Compared to ECG 2019 07:04:18
No significant changes
 
Electronically Signed On 10- 16:18:12 CDT by Mikel Nelson
[email or web address]
 
 
 
 
 
 
 
 
 
 
 
 
 
 
 
 
 
 
 
  <ELECTRONICALLY SIGNED>
                                           By: Mikel Nelson MD, [facility]      
  10/28/19     1618
D: 10/25/19 2051   _____________________________________
T: 10/25/19 2051   Mikel Nelson MD, FACC        /EPI

## 2019-11-11 ENCOUNTER — HOSPITAL ENCOUNTER (EMERGENCY)
Dept: HOSPITAL 96 - M.ERS | Age: 49
Discharge: HOME | End: 2019-11-11
Payer: COMMERCIAL

## 2019-11-11 VITALS — DIASTOLIC BLOOD PRESSURE: 92 MMHG | SYSTOLIC BLOOD PRESSURE: 150 MMHG

## 2019-11-11 VITALS — BODY MASS INDEX: 40.75 KG/M2 | HEIGHT: 63 IN | WEIGHT: 230.01 LBS

## 2019-11-11 DIAGNOSIS — J20.9: Primary | ICD-10-CM

## 2019-11-11 DIAGNOSIS — Z87.442: ICD-10-CM

## 2019-11-11 DIAGNOSIS — Z98.51: ICD-10-CM

## 2019-11-11 DIAGNOSIS — F17.210: ICD-10-CM

## 2019-11-11 DIAGNOSIS — Z88.8: ICD-10-CM

## 2019-11-11 LAB
ABSOLUTE MONOCYTES: 0.7 THOU/UL (ref 0–1.2)
ALBUMIN SERPL-MCNC: 3.4 G/DL (ref 3.4–5)
ALP SERPL-CCNC: 72 U/L (ref 46–116)
ALT SERPL-CCNC: 27 U/L (ref 30–65)
ANION GAP SERPL CALC-SCNC: 7 MMOL/L (ref 7–16)
ANISOCYTOSIS BLD QL SMEAR: (no result)
AST SERPL-CCNC: 15 U/L (ref 15–37)
BILIRUB SERPL-MCNC: 0.2 MG/DL
BUN SERPL-MCNC: 19 MG/DL (ref 7–18)
CALCIUM SERPL-MCNC: 9 MG/DL (ref 8.5–10.1)
CHLORIDE SERPL-SCNC: 103 MMOL/L (ref 98–107)
CO2 SERPL-SCNC: 29 MMOL/L (ref 21–32)
CREAT SERPL-MCNC: 0.7 MG/DL (ref 0.6–1.3)
GLUCOSE SERPL-MCNC: 117 MG/DL (ref 70–99)
GRANULOCYTES NFR BLD MANUAL: 85 %
HCT VFR BLD CALC: 39.1 % (ref 37–47)
HGB BLD-MCNC: 12.6 GM/DL (ref 12–15)
LYMPHOCYTES # BLD: 1.2 THOU/UL (ref 0.8–5.3)
LYMPHOCYTES NFR BLD AUTO: 9 %
MCH RBC QN AUTO: 27.2 PG (ref 26–34)
MCHC RBC AUTO-ENTMCNC: 32.2 G/DL (ref 28–37)
MCV RBC: 84.5 FL (ref 80–100)
MONOCYTES NFR BLD: 5 %
MPV: 7.4 FL. (ref 7.2–11.1)
NEUTROPHILS # BLD: 11.3 THOU/UL (ref 1.6–8.1)
NEUTS BAND NFR BLD: 1 %
NUCLEATED RBCS: 0 /100WBC
PLATELET # BLD EST: ADEQUATE 10*3/UL
PLATELET COUNT*: 388 THOU/UL (ref 150–400)
POTASSIUM SERPL-SCNC: 4.7 MMOL/L (ref 3.5–5.1)
PROT SERPL-MCNC: 7.3 G/DL (ref 6.4–8.2)
RBC # BLD AUTO: 4.63 MIL/UL (ref 4.2–5)
RDW-CV: 18.9 % (ref 10.5–14.5)
SODIUM SERPL-SCNC: 139 MMOL/L (ref 136–145)
WBC # BLD AUTO: 13.1 THOU/UL (ref 4–11)

## 2019-11-12 NOTE — EKG
[place] [address]
Phone:  [phone]                     ELECTROCARDIOGRAM REPORT      
_______________________________________________________________________________
 
Name:       RHONDA DINERO             [id number]      Account #:      [id number]  
Admission:  19     Attend Phys:                         
Discharge:  19     Date of Birth:  [birth year]  
         Report #: [id number]
    [id number]
_______________________________________________________________________________
THIS REPORT FOR:  //name//                      
 
                         [facility] ED
                                       
Test Date:    2019               Test Time:    12:48:01
Pat Name:     RHONDA DINERO            Department:   
Patient ID:   [id number]            Room:          
Gender:       F                        Technician:   CAN
:          1970               Requested By: Jason Cali
Order Number: [id number]    Star MD:   Bandar Gurrola
                                 Measurements
Intervals                              Axis          
Rate:         64                       P:            48
CO:           168                      QRS:          82
QRSD:         99                       T:            73
QT:           385                                    
QTc:          398                                    
                           Interpretive Statements
Sinus rhythm
Baseline wander in lead(s) V2
Compared to ECG 10/25/2019 20:51:32
no significant changes noted
Electronically Signed On 2019 11:00:50 CST by Bandar Gurrola
[email or web address]
 
 
 
 
 
 
 
 
 
 
 
 
 
 
 
 
 
 
 
  <ELECTRONICALLY SIGNED>
                                           By: Bandar Gurrola MD, [facility]   
  19     1100
D: 19 1248   _____________________________________
T: 19 1248   Bandar Gurrola MD, FACC     /EPI

## 2020-01-01 ENCOUNTER — HOSPITAL ENCOUNTER (EMERGENCY)
Dept: HOSPITAL 96 - M.ERS | Age: 50
Discharge: LEFT BEFORE DISCHARGE | End: 2020-01-01
Payer: COMMERCIAL

## 2020-01-01 VITALS — BODY MASS INDEX: 42.88 KG/M2 | HEIGHT: 63 IN | WEIGHT: 242 LBS

## 2020-01-01 VITALS — DIASTOLIC BLOOD PRESSURE: 63 MMHG | SYSTOLIC BLOOD PRESSURE: 102 MMHG

## 2020-01-01 DIAGNOSIS — Z88.8: ICD-10-CM

## 2020-01-01 DIAGNOSIS — R10.84: Primary | ICD-10-CM

## 2020-01-01 DIAGNOSIS — Z87.442: ICD-10-CM

## 2020-01-01 DIAGNOSIS — J44.9: ICD-10-CM

## 2020-01-01 DIAGNOSIS — Z98.51: ICD-10-CM

## 2020-01-01 DIAGNOSIS — F17.210: ICD-10-CM

## 2020-10-03 ENCOUNTER — HOSPITAL ENCOUNTER (EMERGENCY)
Dept: HOSPITAL 96 - M.ERS | Age: 50
LOS: 1 days | Discharge: HOME | End: 2020-10-04
Payer: COMMERCIAL

## 2020-10-03 VITALS — BODY MASS INDEX: 42.52 KG/M2 | WEIGHT: 240 LBS | HEIGHT: 63 IN

## 2020-10-03 DIAGNOSIS — F17.210: ICD-10-CM

## 2020-10-03 DIAGNOSIS — Z98.51: ICD-10-CM

## 2020-10-03 DIAGNOSIS — Z87.442: ICD-10-CM

## 2020-10-03 DIAGNOSIS — J44.9: ICD-10-CM

## 2020-10-03 DIAGNOSIS — M79.662: Primary | ICD-10-CM

## 2020-10-04 VITALS — SYSTOLIC BLOOD PRESSURE: 154 MMHG | DIASTOLIC BLOOD PRESSURE: 79 MMHG

## 2021-01-24 ENCOUNTER — HOSPITAL ENCOUNTER (INPATIENT)
Dept: HOSPITAL 96 - M.ERS | Age: 51
LOS: 9 days | Discharge: HOME | DRG: 354 | End: 2021-02-02
Attending: SURGERY | Admitting: SURGERY
Payer: COMMERCIAL

## 2021-01-24 VITALS — WEIGHT: 240 LBS | HEIGHT: 62.99 IN | BODY MASS INDEX: 42.52 KG/M2

## 2021-01-24 VITALS — DIASTOLIC BLOOD PRESSURE: 47 MMHG | SYSTOLIC BLOOD PRESSURE: 120 MMHG

## 2021-01-24 VITALS — DIASTOLIC BLOOD PRESSURE: 59 MMHG | SYSTOLIC BLOOD PRESSURE: 103 MMHG

## 2021-01-24 VITALS — SYSTOLIC BLOOD PRESSURE: 120 MMHG | DIASTOLIC BLOOD PRESSURE: 86 MMHG

## 2021-01-24 VITALS — SYSTOLIC BLOOD PRESSURE: 116 MMHG | DIASTOLIC BLOOD PRESSURE: 68 MMHG

## 2021-01-24 DIAGNOSIS — M96.842: ICD-10-CM

## 2021-01-24 DIAGNOSIS — K43.6: Primary | ICD-10-CM

## 2021-01-24 DIAGNOSIS — J44.9: ICD-10-CM

## 2021-01-24 DIAGNOSIS — Z88.8: ICD-10-CM
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
## 2021-01-24 DIAGNOSIS — Z79.899: ICD-10-CM

## 2021-01-24 DIAGNOSIS — K42.0: ICD-10-CM

## 2021-01-24 DIAGNOSIS — F17.200: ICD-10-CM

## 2021-01-24 DIAGNOSIS — K56.7: ICD-10-CM
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
## 2021-01-24 LAB
ABSOLUTE BASOPHILS: 0.1 THOU/UL (ref 0–0.2)
ABSOLUTE EOSINOPHILS: 0.4 THOU/UL (ref 0–0.7)
ABSOLUTE MONOCYTES: 0.9 THOU/UL (ref 0–1.2)
ALBUMIN SERPL-MCNC: 3.1 G/DL (ref 3.4–5)
ALP SERPL-CCNC: 89 U/L (ref 46–116)
ALT SERPL-CCNC: 21 U/L (ref 30–65)
ANION GAP SERPL CALC-SCNC: 11 MMOL/L (ref 7–16)
AST SERPL-CCNC: 18 U/L (ref 15–37)
BASOPHILS NFR BLD AUTO: 0.5 %
BILIRUB SERPL-MCNC: 1.2 MG/DL
BILIRUB UR-MCNC: (no result) MG/DL
BUN SERPL-MCNC: 17 MG/DL (ref 7–18)
CALCIUM SERPL-MCNC: 8.6 MG/DL (ref 8.5–10.1)
CHLORIDE SERPL-SCNC: 104 MMOL/L (ref 98–107)
CO2 SERPL-SCNC: 22 MMOL/L (ref 21–32)
COLOR UR: YELLOW
CREAT SERPL-MCNC: 0.7 MG/DL (ref 0.6–1.3)
EOSINOPHIL NFR BLD: 2.6 %
GLUCOSE SERPL-MCNC: 106 MG/DL (ref 70–99)
GRANULOCYTES NFR BLD MANUAL: 80.3 %
HCT VFR BLD CALC: 40.9 % (ref 37–47)
HGB BLD-MCNC: 13.1 GM/DL (ref 12–15)
KETONES UR STRIP-MCNC: (no result) MG/DL
LIPASE: 87 U/L (ref 73–393)
LYMPHOCYTES # BLD: 1.4 THOU/UL (ref 0.8–5.3)
LYMPHOCYTES NFR BLD AUTO: 10.3 %
MCH RBC QN AUTO: 24.9 PG (ref 26–34)
MCHC RBC AUTO-ENTMCNC: 31.9 G/DL (ref 28–37)
MCV RBC: 78.1 FL (ref 80–100)
MONOCYTES NFR BLD: 6.3 %
MPV: 7.2 FL. (ref 7.2–11.1)
NEUTROPHILS # BLD: 10.8 THOU/UL (ref 1.6–8.1)
NUCLEATED RBCS: 0 /100WBC
PLATELET COUNT*: 372 THOU/UL (ref 150–400)
POTASSIUM SERPL-SCNC: 4.1 MMOL/L (ref 3.5–5.1)
PROT SERPL-MCNC: 6.7 G/DL (ref 6.4–8.2)
PROT UR QL STRIP: NEGATIVE
RBC # BLD AUTO: 5.24 MIL/UL (ref 4.2–5)
RBC # UR STRIP: NEGATIVE /UL
RDW-CV: 16.8 % (ref 10.5–14.5)
SODIUM SERPL-SCNC: 137 MMOL/L (ref 136–145)
SP GR UR STRIP: >= 1.03 (ref 1–1.03)
URINE CLARITY: CLEAR
URINE GLUCOSE-RANDOM: NEGATIVE
URINE LEUKOCYTES-REFLEX: NEGATIVE
URINE NITRITE-REFLEX: NEGATIVE
UROBILINOGEN UR STRIP-ACNC: 0.2 E.U./DL (ref 0.2–1)
WBC # BLD AUTO: 13.4 THOU/UL (ref 4–11)

## 2021-01-24 NOTE — OP
[facility] 
[address] [place]  [address]                    OPERATIVE REPORT              
_______________________________________________________________________________
 
Name:       RHONDA DINERO             Room:           [address] [place] SALES#:  [id number]      Account #:      [id number]  
Admission:  01/24/21     Attend Phys:    Saul Mejias
Discharge:               Date of Birth:  [birth year]  
         Report #: [id number]
                                                                     [id number]  
_______________________________________________________________________________
THIS REPORT FOR:  
 
cc:  FAM - No family physician/PCP 
     FAM - No family physician/PCP                                        ~
     Saul Mejias MD      
 
DATE OF SERVICE:  01/24/2021
 
 
PREOPERATIVE DIAGNOSIS:  Incarcerated ventral incisional hernia.
 
POSTOPERATIVE DIAGNOSIS:  Incarcerated ventral incisional hernia.
 
OPERATION:  Laparoscopic repair of incarcerated ventral incisional hernia with
mesh.
 
SURGEON:  Saul Mejias MD
 
ANESTHESIA:  General.
 
ESTIMATED BLOOD LOSS:  Minimal.
 
SPECIMEN:  None.
 
DESCRIPTION OF PROCEDURE:  After informed consent was obtained, the patient was
brought to the operating room and placed supine.  SCDs were placed and working,
preoperative antibiotics were administered, general anesthesia was induced.  The
abdomen was prepped and draped in usual sterile fashion.  A 5 mm incision was
made in the left upper quadrant.  A 5 mm trocar was placed under direct vision. 
An 8 mm and another 5 mm trocar were placed on the left side under direct vision
and a right-sided 5 mm trocar was placed.  She had incarcerated small bowel and
an umbilical hernia defect.  The small bowel was grasped and retracted
posteriorly.  I then transected the hernia sac, taking great care not to injure
the small bowel.  I fully examined the small bowel to make sure that there was
no injury to the bowel.  I took pictures to document this.  The defect measured
approximately 1.5 cm.  I then inserted an 11 cm Bard Ventralight mesh.  It was
tacked up to the abdominal wall with 35 absorbable tacks to cover the defect
widely.  There was good coverage of the mesh.  A transfascial 2-0 Ethibond
suture was placed in the superior aspect of the mesh using a PMI suture passer.
 
The ports were removed under direct vision.  The skin was closed with 4-0
Monocryl.  Incisions were dressed with Steri-Strips.
 
COMPLICATIONS:  None.
 
 
 
[place] [address]                    OPERATIVE REPORT              
_______________________________________________________________________________
 
Name:       RHONDA DINERO             Room:           [address] 
ERIC.#:  [id number]      Account #:      [id number]  
Admission:  01/24/21     Attend Phys:    Salu Mejias
Discharge:               Date of Birth:  [birth year]  
         Report #: [id number]
                                                                     [id number]  
_______________________________________________________________________________
 
 
DISPOSITION:  The patient was taken to recovery in satisfactory condition.
 
 
 
 
 
 
 
 
 
 
 
 
 
 
 
 
 
 
 
 
 
 
 
 
 
 
 
 
 
 
 
 
 
 
 
 
 
 
 
 
 
                       
                                        By:                                
                 
D: 01/24/21 1617_______________________________________
T: 01/24/21 1724Saul Mejias MD        /nt

## 2021-01-25 VITALS — SYSTOLIC BLOOD PRESSURE: 103 MMHG | DIASTOLIC BLOOD PRESSURE: 67 MMHG

## 2021-01-25 VITALS — SYSTOLIC BLOOD PRESSURE: 108 MMHG | DIASTOLIC BLOOD PRESSURE: 64 MMHG

## 2021-01-25 VITALS — DIASTOLIC BLOOD PRESSURE: 66 MMHG | SYSTOLIC BLOOD PRESSURE: 97 MMHG

## 2021-01-25 VITALS — SYSTOLIC BLOOD PRESSURE: 116 MMHG | DIASTOLIC BLOOD PRESSURE: 70 MMHG

## 2021-01-25 VITALS — SYSTOLIC BLOOD PRESSURE: 113 MMHG | DIASTOLIC BLOOD PRESSURE: 74 MMHG

## 2021-01-25 NOTE — EKG
[place] [address]
Phone:  [phone]                     ELECTROCARDIOGRAM REPORT      
_______________________________________________________________________________
 
Name:         RHONDA DINERO            Room:          [address]
M.R.#:    [id number]     Account #:     [id number]  
Admission:    21    Attend Phys:   Saul Tristan
Discharge:                Date of Birth: [birth year]  
Date of Service: 21 1039  Report #:      [id number]
        [id number]
_______________________________________________________________________________
THIS REPORT FOR:  //name//                      
 
                         [facility] ED
                                       
Test Date:    2021               Test Time:    10:39:15
Pat Name:     RHONDA DINERO            Department:   
Patient ID:   [id number]            Room:         [facility]
Gender:       F                        Technician:   
:          1970               Requested By: Miko Rowell
Order Number: [id number] MD:   Bandar Gurrola
                                 Measurements
Intervals                              Axis          
Rate:         91                       P:            79
WV:           152                      QRS:          71
QRSD:         82                       T:            67
QT:           338                                    
QTc:          416                                    
                           Interpretive Statements
Sinus rhythm
Abnormal R-wave progression, early transition
Baseline wander in lead(s) II,III,aVR,aVL,aVF,V1,V2,V3,V4,V5,V6
Compared to ECG 2019 12:48:01
No significant changes
Electronically Signed On 2021 15:06:42 CST by Bandar Gurrola
[email or web address]
 
 
 
 
 
 
 
 
 
 
 
 
 
 
 
 
 
 
 
  <ELECTRONICALLY SIGNED>
                                           By: Bandar Gurrola MD, FACC   
  21     1506
D: 21 1039   _____________________________________
T: 21 1039   Bandar Gurrola MD, [facility]     /EPI

## 2021-01-26 VITALS — DIASTOLIC BLOOD PRESSURE: 77 MMHG | SYSTOLIC BLOOD PRESSURE: 116 MMHG

## 2021-01-26 VITALS — DIASTOLIC BLOOD PRESSURE: 75 MMHG | SYSTOLIC BLOOD PRESSURE: 132 MMHG

## 2021-01-26 VITALS — SYSTOLIC BLOOD PRESSURE: 135 MMHG | DIASTOLIC BLOOD PRESSURE: 72 MMHG

## 2021-01-26 VITALS — SYSTOLIC BLOOD PRESSURE: 126 MMHG | DIASTOLIC BLOOD PRESSURE: 85 MMHG

## 2021-01-26 LAB
ABSOLUTE EOSINOPHILS: 0.1 THOU/UL (ref 0–0.7)
ABSOLUTE MONOCYTES: 0.3 THOU/UL (ref 0–1.2)
ANION GAP SERPL CALC-SCNC: 5 MMOL/L (ref 7–16)
BUN SERPL-MCNC: 20 MG/DL (ref 7–18)
CALCIUM SERPL-MCNC: 9.2 MG/DL (ref 8.5–10.1)
CHLORIDE SERPL-SCNC: 98 MMOL/L (ref 98–107)
CO2 SERPL-SCNC: 30 MMOL/L (ref 21–32)
CREAT SERPL-MCNC: 0.6 MG/DL (ref 0.6–1.3)
EOSINOPHIL NFR BLD: 1 %
GLUCOSE SERPL-MCNC: 120 MG/DL (ref 70–99)
GRANULOCYTES NFR BLD MANUAL: 94 %
HCT VFR BLD CALC: 40.5 % (ref 37–47)
HGB BLD-MCNC: 12.8 GM/DL (ref 12–15)
LYMPHOCYTES # BLD: 0.4 THOU/UL (ref 0.8–5.3)
LYMPHOCYTES NFR BLD AUTO: 3 %
MCH RBC QN AUTO: 25.1 PG (ref 26–34)
MCHC RBC AUTO-ENTMCNC: 31.6 G/DL (ref 28–37)
MCV RBC: 79.3 FL (ref 80–100)
MONOCYTES NFR BLD: 2 %
MPV: 7.3 FL. (ref 7.2–11.1)
NEUTROPHILS # BLD: 13 THOU/UL (ref 1.6–8.1)
NUCLEATED RBCS: 0 /100WBC
PLATELET # BLD EST: ADEQUATE 10*3/UL
PLATELET COUNT*: 299 THOU/UL (ref 150–400)
POTASSIUM SERPL-SCNC: 4.8 MMOL/L (ref 3.5–5.1)
RBC # BLD AUTO: 5.1 MIL/UL (ref 4.2–5)
RDW-CV: 16.6 % (ref 10.5–14.5)
SODIUM SERPL-SCNC: 133 MMOL/L (ref 136–145)
WBC # BLD AUTO: 13.8 THOU/UL (ref 4–11)

## 2021-01-26 PROCEDURE — 0WUF4JZ SUPPLEMENT ABDOMINAL WALL WITH SYNTHETIC SUBSTITUTE, PERCUTANEOUS ENDOSCOPIC APPROACH: ICD-10-PCS | Performed by: SURGERY

## 2021-01-27 VITALS — DIASTOLIC BLOOD PRESSURE: 80 MMHG | SYSTOLIC BLOOD PRESSURE: 127 MMHG

## 2021-01-27 VITALS — SYSTOLIC BLOOD PRESSURE: 107 MMHG | DIASTOLIC BLOOD PRESSURE: 87 MMHG

## 2021-01-27 VITALS — SYSTOLIC BLOOD PRESSURE: 123 MMHG | DIASTOLIC BLOOD PRESSURE: 77 MMHG

## 2021-01-28 VITALS — DIASTOLIC BLOOD PRESSURE: 84 MMHG | SYSTOLIC BLOOD PRESSURE: 129 MMHG

## 2021-01-28 VITALS — DIASTOLIC BLOOD PRESSURE: 94 MMHG | SYSTOLIC BLOOD PRESSURE: 152 MMHG

## 2021-01-28 VITALS — SYSTOLIC BLOOD PRESSURE: 112 MMHG | DIASTOLIC BLOOD PRESSURE: 84 MMHG

## 2021-01-28 LAB
ANION GAP SERPL CALC-SCNC: 5 MMOL/L (ref 7–16)
BUN SERPL-MCNC: 23 MG/DL (ref 7–18)
CALCIUM SERPL-MCNC: 9.6 MG/DL (ref 8.5–10.1)
CHLORIDE SERPL-SCNC: 93 MMOL/L (ref 98–107)
CO2 SERPL-SCNC: 39 MMOL/L (ref 21–32)
CREAT SERPL-MCNC: 0.7 MG/DL (ref 0.6–1.3)
GLUCOSE SERPL-MCNC: 125 MG/DL (ref 70–99)
POTASSIUM SERPL-SCNC: 4.4 MMOL/L (ref 3.5–5.1)
SODIUM SERPL-SCNC: 137 MMOL/L (ref 136–145)

## 2021-01-29 VITALS — DIASTOLIC BLOOD PRESSURE: 78 MMHG | SYSTOLIC BLOOD PRESSURE: 116 MMHG

## 2021-01-29 VITALS — SYSTOLIC BLOOD PRESSURE: 133 MMHG | DIASTOLIC BLOOD PRESSURE: 77 MMHG

## 2021-01-29 VITALS — DIASTOLIC BLOOD PRESSURE: 72 MMHG | SYSTOLIC BLOOD PRESSURE: 119 MMHG

## 2021-01-29 LAB
ANION GAP SERPL CALC-SCNC: 5 MMOL/L (ref 7–16)
BUN SERPL-MCNC: 25 MG/DL (ref 7–18)
CALCIUM SERPL-MCNC: 8.6 MG/DL (ref 8.5–10.1)
CHLORIDE SERPL-SCNC: 94 MMOL/L (ref 98–107)
CO2 SERPL-SCNC: 39 MMOL/L (ref 21–32)
CREAT SERPL-MCNC: 0.8 MG/DL (ref 0.6–1.3)
GLUCOSE SERPL-MCNC: 107 MG/DL (ref 70–99)
HCT VFR BLD CALC: 37.2 % (ref 37–47)
HGB BLD-MCNC: 12.1 GM/DL (ref 12–15)
MCH RBC QN AUTO: 25.7 PG (ref 26–34)
MCHC RBC AUTO-ENTMCNC: 32.5 G/DL (ref 28–37)
MCV RBC: 79 FL (ref 80–100)
MPV: 7.3 FL. (ref 7.2–11.1)
PLATELET COUNT*: 446 THOU/UL (ref 150–400)
POTASSIUM SERPL-SCNC: 3.5 MMOL/L (ref 3.5–5.1)
RBC # BLD AUTO: 4.71 MIL/UL (ref 4.2–5)
RDW-CV: 17 % (ref 10.5–14.5)
SODIUM SERPL-SCNC: 138 MMOL/L (ref 136–145)
WBC # BLD AUTO: 10.1 THOU/UL (ref 4–11)

## 2021-01-30 VITALS — SYSTOLIC BLOOD PRESSURE: 103 MMHG | DIASTOLIC BLOOD PRESSURE: 73 MMHG

## 2021-01-30 VITALS — SYSTOLIC BLOOD PRESSURE: 124 MMHG | DIASTOLIC BLOOD PRESSURE: 70 MMHG

## 2021-01-30 VITALS — DIASTOLIC BLOOD PRESSURE: 76 MMHG | SYSTOLIC BLOOD PRESSURE: 116 MMHG

## 2021-01-30 VITALS — SYSTOLIC BLOOD PRESSURE: 118 MMHG | DIASTOLIC BLOOD PRESSURE: 78 MMHG

## 2021-01-31 VITALS — DIASTOLIC BLOOD PRESSURE: 66 MMHG | SYSTOLIC BLOOD PRESSURE: 98 MMHG

## 2021-01-31 VITALS — DIASTOLIC BLOOD PRESSURE: 68 MMHG | SYSTOLIC BLOOD PRESSURE: 105 MMHG

## 2021-01-31 VITALS — DIASTOLIC BLOOD PRESSURE: 77 MMHG | SYSTOLIC BLOOD PRESSURE: 115 MMHG

## 2021-01-31 VITALS — SYSTOLIC BLOOD PRESSURE: 120 MMHG | DIASTOLIC BLOOD PRESSURE: 78 MMHG

## 2021-01-31 VITALS — SYSTOLIC BLOOD PRESSURE: 122 MMHG | DIASTOLIC BLOOD PRESSURE: 78 MMHG

## 2021-02-01 VITALS — DIASTOLIC BLOOD PRESSURE: 76 MMHG | SYSTOLIC BLOOD PRESSURE: 127 MMHG

## 2021-02-01 VITALS — DIASTOLIC BLOOD PRESSURE: 78 MMHG | SYSTOLIC BLOOD PRESSURE: 117 MMHG

## 2021-02-01 VITALS — DIASTOLIC BLOOD PRESSURE: 67 MMHG | SYSTOLIC BLOOD PRESSURE: 99 MMHG

## 2021-02-01 VITALS — SYSTOLIC BLOOD PRESSURE: 118 MMHG | DIASTOLIC BLOOD PRESSURE: 68 MMHG

## 2021-02-01 LAB
ANION GAP SERPL CALC-SCNC: 2 MMOL/L (ref 7–16)
BUN SERPL-MCNC: 12 MG/DL (ref 7–18)
CALCIUM SERPL-MCNC: 8.4 MG/DL (ref 8.5–10.1)
CHLORIDE SERPL-SCNC: 93 MMOL/L (ref 98–107)
CO2 SERPL-SCNC: 41 MMOL/L (ref 21–32)
CREAT SERPL-MCNC: 0.7 MG/DL (ref 0.6–1.3)
GLUCOSE SERPL-MCNC: 113 MG/DL (ref 70–99)
HCT VFR BLD CALC: 36.6 % (ref 37–47)
HGB BLD-MCNC: 11.6 GM/DL (ref 12–15)
MCH RBC QN AUTO: 25.1 PG (ref 26–34)
MCHC RBC AUTO-ENTMCNC: 31.8 G/DL (ref 28–37)
MCV RBC: 78.8 FL (ref 80–100)
MPV: 6.8 FL. (ref 7.2–11.1)
PLATELET COUNT*: 400 THOU/UL (ref 150–400)
POTASSIUM SERPL-SCNC: 2.9 MMOL/L (ref 3.5–5.1)
RBC # BLD AUTO: 4.64 MIL/UL (ref 4.2–5)
RDW-CV: 16.2 % (ref 10.5–14.5)
SODIUM SERPL-SCNC: 136 MMOL/L (ref 136–145)
WBC # BLD AUTO: 10.3 THOU/UL (ref 4–11)

## 2021-02-02 VITALS — DIASTOLIC BLOOD PRESSURE: 76 MMHG | SYSTOLIC BLOOD PRESSURE: 112 MMHG

## 2021-02-02 VITALS — DIASTOLIC BLOOD PRESSURE: 67 MMHG | SYSTOLIC BLOOD PRESSURE: 99 MMHG

## 2021-02-02 VITALS — SYSTOLIC BLOOD PRESSURE: 99 MMHG | DIASTOLIC BLOOD PRESSURE: 67 MMHG

## 2021-05-05 ENCOUNTER — HOSPITAL ENCOUNTER (EMERGENCY)
Dept: HOSPITAL 96 - M.ERS | Age: 51
Discharge: HOME | End: 2021-05-05
Payer: COMMERCIAL

## 2021-05-05 VITALS — SYSTOLIC BLOOD PRESSURE: 144 MMHG | DIASTOLIC BLOOD PRESSURE: 87 MMHG

## 2021-05-05 VITALS — HEIGHT: 63 IN | BODY MASS INDEX: 46.07 KG/M2 | WEIGHT: 260.01 LBS

## 2021-05-05 DIAGNOSIS — R22.0: ICD-10-CM

## 2021-05-05 DIAGNOSIS — F17.210: ICD-10-CM

## 2021-05-05 DIAGNOSIS — K04.7: Primary | ICD-10-CM

## 2021-05-05 DIAGNOSIS — Z88.8: ICD-10-CM

## 2021-05-05 DIAGNOSIS — Z87.442: ICD-10-CM

## 2021-05-05 DIAGNOSIS — Z88.5: ICD-10-CM

## 2021-05-05 DIAGNOSIS — K02.9: ICD-10-CM

## 2021-05-05 DIAGNOSIS — J44.9: ICD-10-CM

## 2021-07-13 ENCOUNTER — HOSPITAL ENCOUNTER (EMERGENCY)
Dept: HOSPITAL 96 - M.ERS | Age: 51
Discharge: HOME | End: 2021-07-13
Payer: COMMERCIAL

## 2021-07-13 VITALS — HEIGHT: 63 IN | BODY MASS INDEX: 47.84 KG/M2 | WEIGHT: 270 LBS

## 2021-07-13 VITALS — DIASTOLIC BLOOD PRESSURE: 78 MMHG | SYSTOLIC BLOOD PRESSURE: 132 MMHG

## 2021-07-13 DIAGNOSIS — F17.210: ICD-10-CM

## 2021-07-13 DIAGNOSIS — Z88.8: ICD-10-CM

## 2021-07-13 DIAGNOSIS — Z88.5: ICD-10-CM

## 2021-07-13 DIAGNOSIS — M25.512: Primary | ICD-10-CM

## 2021-07-13 DIAGNOSIS — J44.9: ICD-10-CM
